# Patient Record
Sex: MALE
[De-identification: names, ages, dates, MRNs, and addresses within clinical notes are randomized per-mention and may not be internally consistent; named-entity substitution may affect disease eponyms.]

---

## 2018-03-22 ENCOUNTER — CHARTING TRANS (OUTPATIENT)
Dept: OTHER | Age: 28
End: 2018-03-22

## 2018-03-22 ASSESSMENT — PAIN SCALES - GENERAL: PAINLEVEL_OUTOF10: 0

## 2018-06-15 ENCOUNTER — CHARTING TRANS (OUTPATIENT)
Dept: OTHER | Age: 28
End: 2018-06-15

## 2018-11-01 VITALS
HEIGHT: 72 IN | HEART RATE: 80 BPM | OXYGEN SATURATION: 99 % | SYSTOLIC BLOOD PRESSURE: 132 MMHG | BODY MASS INDEX: 26.84 KG/M2 | WEIGHT: 198.18 LBS | DIASTOLIC BLOOD PRESSURE: 92 MMHG

## 2023-08-19 ENCOUNTER — HOSPITAL ENCOUNTER (EMERGENCY)
Facility: HOSPITAL | Age: 33
Discharge: HOME | End: 2023-08-19
Attending: EMERGENCY MEDICINE
Payer: MEDICAID

## 2023-08-19 VITALS
HEART RATE: 69 BPM | DIASTOLIC BLOOD PRESSURE: 98 MMHG | OXYGEN SATURATION: 97 % | TEMPERATURE: 97 F | RESPIRATION RATE: 19 BRPM | SYSTOLIC BLOOD PRESSURE: 145 MMHG

## 2023-08-19 DIAGNOSIS — M54.30 SCIATICA, UNSPECIFIED LATERALITY: ICD-10-CM

## 2023-08-19 DIAGNOSIS — K12.2 UVULITIS: Primary | ICD-10-CM

## 2023-08-19 LAB
L PNEUMO AG UR QL: NEGATIVE
SARS-COV-2 RNA RESP QL NAA+PROBE: NOT DETECTED

## 2023-08-19 PROCEDURE — 87449 NOS EACH ORGANISM AG IA: CPT | Performed by: EMERGENCY MEDICINE

## 2023-08-19 PROCEDURE — 99283 EMERGENCY DEPT VISIT LOW MDM: CPT

## 2023-08-19 PROCEDURE — 87430 STREP A AG IA: CPT | Performed by: EMERGENCY MEDICINE

## 2023-08-19 PROCEDURE — 99284 EMERGENCY DEPT VISIT MOD MDM: CPT

## 2023-08-19 RX ORDER — CYCLOBENZAPRINE HCL 10 MG
10 TABLET ORAL 3 TIMES DAILY PRN
Qty: 15 TABLET | Refills: 0 | Status: SHIPPED | OUTPATIENT
Start: 2023-08-19

## 2023-08-19 RX ORDER — PREDNISONE 20 MG/1
40 TABLET ORAL DAILY
Qty: 10 TABLET | Refills: 0 | Status: SHIPPED | OUTPATIENT
Start: 2023-08-19 | End: 2023-08-24

## 2023-08-19 NOTE — DISCHARGE INSTRUCTIONS
A urine test was sent off to rule out legionnaires disease.   If it returns positive you will receive a call from us

## 2023-08-19 NOTE — ED INITIAL ASSESSMENT (HPI)
Pt states he woke up this morning and his tonsils were very swollen. C/o sore throat. Afebrile. No cough.

## 2024-01-16 RX ORDER — LOSARTAN POTASSIUM 50 MG/1
50 TABLET ORAL DAILY
COMMUNITY

## 2024-01-16 RX ORDER — NAPROXEN 500 MG/1
500 TABLET ORAL 2 TIMES DAILY WITH MEALS
COMMUNITY

## 2024-01-16 RX ORDER — CETIRIZINE HYDROCHLORIDE 10 MG/1
10 TABLET ORAL DAILY
COMMUNITY

## 2024-01-16 RX ORDER — OMEPRAZOLE 20 MG/1
20 CAPSULE, DELAYED RELEASE ORAL
COMMUNITY

## 2024-01-16 RX ORDER — FLUTICASONE PROPIONATE 50 MCG
SPRAY, SUSPENSION (ML) NASAL DAILY
COMMUNITY

## 2024-01-16 RX ORDER — DEXTROAMPHETAMINE SACCHARATE, AMPHETAMINE ASPARTATE, DEXTROAMPHETAMINE SULFATE AND AMPHETAMINE SULFATE 5; 5; 5; 5 MG/1; MG/1; MG/1; MG/1
20 TABLET ORAL DAILY
COMMUNITY

## 2024-01-16 RX ORDER — AZELASTINE 1 MG/ML
1 SPRAY, METERED NASAL 2 TIMES DAILY
COMMUNITY

## 2024-02-21 ENCOUNTER — ANESTHESIA EVENT (OUTPATIENT)
Dept: ENDOSCOPY | Facility: HOSPITAL | Age: 34
End: 2024-02-21
Payer: MEDICAID

## 2024-02-21 NOTE — ANESTHESIA PREPROCEDURE EVALUATION
PRE-OP EVALUATION    Patient Name: Maxwell Mack    Admit Diagnosis: DIARRHEA, UNSPECIFIED; ABDOMINAL PAIN; RECTAL BLEEDING; NAUSEA    Pre-op Diagnosis: DIARRHEA, UNSPECIFIED; ABDOMINAL PAIN; RECTAL BLEEDING; NAUSEA    ESOPHAGOGASTRODUODENOSCOPY (EGD), COLONOSCOPY    Anesthesia Procedure: ESOPHAGOGASTRODUODENOSCOPY (EGD), COLONOSCOPY  COLONOSCOPY    Surgeon(s) and Role:     * Jere Goyal, DO - Primary    Pre-op vitals reviewed.        Body mass index is 30.68 kg/m².    Current medications reviewed.  Hospital Medications:  No current facility-administered medications on file as of .       Outpatient Medications:     No medications prior to admission.       Allergies: Pollen      Anesthesia Evaluation    Patient summary reviewed.    Anesthetic Complications  (-) history of anesthetic complications         GI/Hepatic/Renal    Negative GI/hepatic/renal ROS.                             Cardiovascular            MET: >4      (+) hypertension                                     Endo/Other    Negative endo/other ROS.                              Pulmonary    Negative pulmonary ROS.                       Neuro/Psych  Comment: ADHD                            There is no problem list on file for this patient.            History reviewed. No pertinent surgical history.  Social History     Socioeconomic History    Marital status: Single   Tobacco Use    Smoking status: Never    Smokeless tobacco: Never   Vaping Use    Vaping Use: Some days   Substance and Sexual Activity    Alcohol use: Yes     Comment: occasionally    Drug use: Yes     Types: Cannabis     Comment: occasionally     History   Drug Use    Types: Cannabis     Comment: occasionally     Available pre-op labs reviewed.               Airway      Mallampati: II  Mouth opening: >3 FB  TM distance: > 6 cm  Neck ROM: full Cardiovascular    Cardiovascular exam normal.         Dental    Dentition appears grossly intact         Pulmonary    Pulmonary exam  normal.                 Other findings              ASA: 2   Plan: MAC  NPO status verified and patient meets guidelines.  Patient has taken beta blockers in last 24 hours.  Post-procedure pain management plan discussed with surgeon and patient.      Plan/risks discussed with: patient                Present on Admission:  **None**

## 2024-02-22 ENCOUNTER — HOSPITAL ENCOUNTER (OUTPATIENT)
Facility: HOSPITAL | Age: 34
Setting detail: HOSPITAL OUTPATIENT SURGERY
Discharge: HOME | End: 2024-02-22
Attending: INTERNAL MEDICINE | Admitting: INTERNAL MEDICINE
Payer: MEDICAID

## 2024-02-22 ENCOUNTER — ANESTHESIA (OUTPATIENT)
Dept: ENDOSCOPY | Facility: HOSPITAL | Age: 34
End: 2024-02-22
Payer: MEDICAID

## 2024-02-22 VITALS
TEMPERATURE: 98 F | HEART RATE: 86 BPM | DIASTOLIC BLOOD PRESSURE: 88 MMHG | RESPIRATION RATE: 16 BRPM | SYSTOLIC BLOOD PRESSURE: 131 MMHG | OXYGEN SATURATION: 99 % | WEIGHT: 220 LBS | HEIGHT: 71 IN | BODY MASS INDEX: 30.8 KG/M2

## 2024-02-22 PROCEDURE — 0DBE8ZX EXCISION OF LARGE INTESTINE, VIA NATURAL OR ARTIFICIAL OPENING ENDOSCOPIC, DIAGNOSTIC: ICD-10-PCS | Performed by: INTERNAL MEDICINE

## 2024-02-22 PROCEDURE — 88305 TISSUE EXAM BY PATHOLOGIST: CPT | Performed by: INTERNAL MEDICINE

## 2024-02-22 PROCEDURE — 0DB98ZX EXCISION OF DUODENUM, VIA NATURAL OR ARTIFICIAL OPENING ENDOSCOPIC, DIAGNOSTIC: ICD-10-PCS | Performed by: INTERNAL MEDICINE

## 2024-02-22 PROCEDURE — 0DB68ZX EXCISION OF STOMACH, VIA NATURAL OR ARTIFICIAL OPENING ENDOSCOPIC, DIAGNOSTIC: ICD-10-PCS | Performed by: INTERNAL MEDICINE

## 2024-02-22 RX ORDER — SODIUM CHLORIDE, SODIUM LACTATE, POTASSIUM CHLORIDE, CALCIUM CHLORIDE 600; 310; 30; 20 MG/100ML; MG/100ML; MG/100ML; MG/100ML
INJECTION, SOLUTION INTRAVENOUS CONTINUOUS
Status: DISCONTINUED | OUTPATIENT
Start: 2024-02-22 | End: 2024-02-22

## 2024-02-22 RX ORDER — LIDOCAINE HYDROCHLORIDE 10 MG/ML
INJECTION, SOLUTION EPIDURAL; INFILTRATION; INTRACAUDAL; PERINEURAL AS NEEDED
Status: DISCONTINUED | OUTPATIENT
Start: 2024-02-22 | End: 2024-02-22 | Stop reason: SURG

## 2024-02-22 RX ADMIN — SODIUM CHLORIDE, SODIUM LACTATE, POTASSIUM CHLORIDE, CALCIUM CHLORIDE: 600; 310; 30; 20 INJECTION, SOLUTION INTRAVENOUS at 08:11:00

## 2024-02-22 RX ADMIN — LIDOCAINE HYDROCHLORIDE 100 MG: 10 INJECTION, SOLUTION EPIDURAL; INFILTRATION; INTRACAUDAL; PERINEURAL at 07:49:00

## 2024-02-22 NOTE — DISCHARGE INSTRUCTIONS
ESOPHAGUS:  Normal esophagus   STOMACH:  Mild diffuse gastritis. Biopsied for H. Pylori infection    DUODENUM:  Normal duodenum. Biopsied for celiac disease     Normal colon. No colon polyps or colon cancer. No diverticulosis. Random colon biopsies taken to rule out colitis   Recommendations:   Await pathology results   Next colonoscopy at age 44   Recommendations:   Pantoprazole 40 mg daily for 8 weeks   Await pathology results

## 2024-02-22 NOTE — OPERATIVE REPORT
EGD Operative Report    Maxwell Mack Patient Status:  Hospital Outpatient Surgery    1990 MRN [id number]   [facility] Attending Jere Goyal,    Hosp Day #   0 PCP Aliyah Soto MD       Pre-op Diagnosis: DIARRHEA, UNSPECIFIED; ABDOMINAL PAIN; RECTAL BLEEDING; NAUSEA     Post-Op Diagnosis:    ESOPHAGUS:  Normal esophagus   STOMACH:  Mild diffuse gastritis. Biopsied for H. Pylori infection    DUODENUM:  Normal duodenum. Biopsied for celiac disease     Procedure Performed: EGD     Informed Consent: Informed consent for both the procedure and sedation were obtained from the patient. The potentially life-threatening complications of sedation, bleeding,  Perforation, transfusion or repeat endoscopy were reviewed along with the possible need for hospitalization, surgical management, transfusion or repeat endoscopy should one of these complications arise. The patient understands and is agreeable to proceed.  Sedation Type: MAC-Patient received sedation with monitored anesthesia provided by an anesthesiologist  Moderate Sedation Time: None.  Deep sedation provided by anesthesia.  Procedure Description: The patient was placed in the left lateral decubitus position.  A bite block was placed in the patient’s mouth.  The endoscope was inserted through the mouth and advanced under direct visualization to the 3rd portion of the duodenum and was then withdrawn to examine the duodenal bulb and gastric antrum.  The endoscope was then retroflexed to examine the angulus, GE junction, cardia, body and fundus and then withdrawn to examine the esophagus. The endoscope was then removed from the patient. The patient tolerated the procedure well with no immediate complications and was transferred to the recovery area in stable  condition.  Findings:    ESOPHAGUS:  Normal esophagus   STOMACH:  Mild diffuse gastritis. Biopsied for H. Pylori infection    DUODENUM:  Normal duodenum. Biopsied for celiac disease   Recommendations:   Pantoprazole 40 mg daily for 8 weeks   Await pathology results   Discharge:  The patient was given an after visit summary detailing the procedure, findings, recommendations and follow up plans.  Jere Goyal DO  2/22/2024  7:47 AM

## 2024-02-22 NOTE — OPERATIVE REPORT
Colonoscopy Operative Report    Maxwell Mack Patient Status:  Hospital Outpatient Surgery    1990 MRN [id number]   [facility] Attending Jere Goyal,    Hosp Day #   0 PCP Aliyah Soto MD     Pre-Operative Diagnosis: DIARRHEA, UNSPECIFIED; ABDOMINAL PAIN; RECTAL BLEEDING; NAUSEA    Post-Operative Diagnosis:  Normal colon. No colon polyps or colon cancer. No diverticulosis. Random colon biopsies taken to rule out colitis     Procedure Performed: COLONOSCOPY     Informed Consent: Informed consent for both the procedure and sedation were obtained from the patient. The potentially life-threatening complications of sedation, bleeding,  perforation, transfusion or repeat endoscopy  were reviewed along with the possible need for hospitalization, surgical management, transfusion or repeat endoscopy should one of these complications arise. The patient understands and is agreeable to proceed.  Sedation Type: MAC  A trained sedation nurse was present to assist in monitoring the patient during the entire length of moderate sedation time.    Cecum Withdrawal Time:  6 mins   Date of previous colonoscopy: None     Procedure Description: The patient was placed in the left lateral decubitus position.  After careful digital rectal examination, the Adult colonoscope was inserted into the rectum and advanced to the level of the cecum under direct visualization. The cecum was identified by landmarks, including the appendiceal orifice and ileoceccal valve. Careful examination of the entire colon was performed during withdrawal of the endoscope. The scope was withdrawn to the rectum and retroflexion was performed.  The patient tolerated the procedure well with no immediate complications. The patient was transferred to the recovery area in stable condition.  Quality of Preparation: Adequate  Aronchick Bowel Prep Scale:  1- excellent    Findings:   Normal colon. No colon polyps or colon cancer. No diverticulosis. Random colon biopsies taken to rule out colitis   Recommendations:   Await pathology results   Next colonoscopy at age 44   Discharge:  The patient was given an after visit summary detailing the procedure, findings, recommendations and follow up plans.     Jere Goyal DO  2/22/2024  8:14 AM

## 2024-02-22 NOTE — ANESTHESIA POSTPROCEDURE EVALUATION
[facility]    Maxwell Mack Patient Status:  Hospital Outpatient Surgery   Age/Gender 34 year old male MRN [id number]   Location [facility] Attending Jere Goyal DO   Hosp Day # 0 PCP Aliyah Soto MD       Anesthesia Post-op Note    ESOPHAGOGASTRODUODENOSCOPY (EGD) WITH BIOPSIES, COLONOSCOPY WITH BIOPSIES    Procedure Summary       Date: 02/22/24 Room / Location:  ENDOSCOPY 03 / [facility] END[facility]    Anesthesia Start: 0746 Anesthesia Stop: 0811    Procedures:       ESOPHAGOGASTRODUODENOSCOPY (EGD) WITH BIOPSIES, COLONOSCOPY WITH BIOPSIES      COLONOSCOPY Diagnosis: (EGD- GASTRITIS   COLONOSCOPY- NORMAL)    Surgeons: Jere Goyal DO Anesthesiologist: Myerson, David, MD    Anesthesia Type: MAC ASA Status: 2            Anesthesia Type: MAC    Vitals Value Taken Time   /74 02/22/24 0812   Temp 98.1 °F (36.7 °C) 02/22/24 0811   Pulse 80 02/22/24 0812   Resp 16 02/22/24 0811   SpO2 95 % 02/22/24 0812   Vitals shown include unfiled device data.    Patient Location: Endoscopy    Anesthesia Type: MAC    Airway Patency: patent    Postop Pain Control: adequate    Mental Status: preanesthetic baseline    Nausea/Vomiting: none    Cardiopulmonary/Hydration status: stable euvolemic    Complications: no apparent anesthesia related complications    Postop vital signs: stable    Dental Exam: Unchanged from Preop    Patient to be discharged from PACU when criteria met.

## 2024-02-22 NOTE — H&P
[facility]    Maxwell Mack Patient Status:  Hospital Outpatient Surgery    1990 MRN [id number]   Location [facility] ENDOSCOPY [facility]IN [facility] Attending Jere Goyal DO   Hosp Day # 0 PCP Aliyah Soto MD     Active Problems:    * No active hospital problems. *      Maxwell Mack is a 34 year old male.    Allergies:   Allergies   Allergen Reactions    Pollen HIVES     Trees, grass, dog dander       Past Medical History:   Diagnosis Date    High blood pressure     Hx of motion sickness        Blood pressure 146/88, pulse 67, temperature 97.8 °F (36.6 °C), temperature source Temporal, resp. rate 16, height 5' 11\" (1.803 m), weight 220 lb (99.8 kg), SpO2 97%.    HPI    Review of Systems    Physical Exam  Lungs: cta  Heart: rrr  Abdomen: soft and nontneder    Assessment:  EGD and colon today    Jere Goyal DO  2024

## (undated) DEVICE — ELECTRODE MONIT RED DOT 3PK

## (undated) DEVICE — CANNULA NSL AD W/ FLTR 14FT O2/CO2

## (undated) DEVICE — KIT CUSTOM ENDOPROCEDURE STERIS

## (undated) DEVICE — CANISTER SUCT 1200CC LID BLU HRD ADPT AUTO

## (undated) DEVICE — VALVE AIR/H20 DEFENDO SCT BX

## (undated) DEVICE — BITEBLOCK ENDOSCP 60FR MAXI STRP

## (undated) DEVICE — Device